# Patient Record
Sex: MALE | Race: WHITE | URBAN - METROPOLITAN AREA
[De-identification: names, ages, dates, MRNs, and addresses within clinical notes are randomized per-mention and may not be internally consistent; named-entity substitution may affect disease eponyms.]

---

## 2018-02-22 ENCOUNTER — OFFICE VISIT (OUTPATIENT)
Dept: BEHAVIORAL/MENTAL HEALTH CLINIC | Age: 28
End: 2018-02-22

## 2018-02-22 VITALS
HEART RATE: 78 BPM | WEIGHT: 278 LBS | DIASTOLIC BLOOD PRESSURE: 74 MMHG | BODY MASS INDEX: 38.92 KG/M2 | HEIGHT: 71 IN | SYSTOLIC BLOOD PRESSURE: 111 MMHG

## 2018-02-22 DIAGNOSIS — F32.A DEPRESSION, UNSPECIFIED DEPRESSION TYPE: ICD-10-CM

## 2018-02-22 DIAGNOSIS — F40.298 SPECIFIC PHOBIA: Primary | ICD-10-CM

## 2018-02-22 RX ORDER — BUPROPION HYDROCHLORIDE 150 MG/1
150 TABLET ORAL
Qty: 30 TAB | Refills: 2 | Status: SHIPPED | OUTPATIENT
Start: 2018-02-22 | End: 2018-03-23 | Stop reason: SDUPTHER

## 2018-02-22 NOTE — MR AVS SNAPSHOT
[address] [facility] [address] [facility] 83. 
[phone] Patient: Steve Swanson MRN: [id number] :1990 Visit Information Date & Time Provider Department Dept. Phone Encounter #  
 2018  1:00 PM Yoana Faustin, [address] [facility] [phone] [id number] Upcoming Health Maintenance Date Due DTaP/Tdap/Td series (1 - Tdap) 10/8/2013 Influenza Age 5 to Adult 2017 Allergies as of 2018  Review Complete On: 2018 By: Yoana Faustin MD  
 Not on File Current Immunizations  Never Reviewed No immunizations on file. Not reviewed this visit Vitals BP Pulse Height(growth percentile) Weight(growth percentile) BMI Smoking Status 111/74 78 5' 11\" (1.803 m) 278 lb (126.1 kg) 38.77 kg/m2 Never Smoker Vitals History BMI and BSA Data Body Mass Index Body Surface Area 38.77 kg/m 2 2.51 m 2 Your Updated Medication List  
  
   
This list is accurate as of 18  1:54 PM.  Always use your most recent med list.  
  
  
  
  
 buPROPion  mg tablet Commonly known as:  Eric De La Rosa Take 1 Tab by mouth every morning. Indications: major depressive disorder Prescriptions Printed Refills buPROPion XL (WELLBUTRIN XL) 150 mg tablet 2 Sig: Take 1 Tab by mouth every morning. Indications: major depressive disorder Class: Print Route: Oral  
  
Introducing [facility] & HEALTH SERVICES! [facility] introduces [employer] patient portal. Now you can access parts of your medical record, email your doctor's office, and request medication refills online. 1. In your internet browser, go to [email or web address] 2. Click on the First Time User? Click Here link in the Sign In box. You will see the New Member Sign Up page. 3. Enter your [employer] Access Code exactly as it appears below.  You will not need to use this code after youve completed the sign-up process. If you do not sign up before the expiration date, you must request a new code. · HedgeCo Access Code: [email or web address] Expires: 5/23/2018 12:49 PM 
 
4. Enter the last four digits of your Social Security Number (xxxx) and Date of Birth (mm/dd/yyyy) as indicated and click Submit. You will be taken to the next sign-up page. 5. Create a HedgeCo ID. This will be your HedgeCo login ID and cannot be changed, so think of one that is secure and easy to remember. 6. Create a HedgeCo password. You can change your password at any time. 7. Enter your Password Reset Question and Answer. This can be used at a later time if you forget your password. 8. Enter your e-mail address. You will receive e-mail notification when new information is available in [address] 9. Click Sign Up. You can now view and download portions of your medical record. 10. Click the Download Summary menu link to download a portable copy of your medical information. If you have questions, please visit the Frequently Asked Questions section of the [employer] website. Remember, [employer]Co is NOT to be used for urgent needs. For medical emergencies, dial 911. Now available from your iPhone and Android! Please provide this summary of care documentation to your next provider. If you have any questions after today's visit, please call [phone].

## 2018-02-22 NOTE — PROGRESS NOTES
Asaf Paxton  1990  24 y.o.  male  [facility] [place]    Chief Complaint   Patient presents with    Anxiety    Fatigue    Depression       [place]:   This is a 24years old single never   male with no past psychiatric history or treatment who is referred by his mother for evaluation and possible treatment of his social isolation. He presented on time, was alert awake oriented to time person and place and was calm and cooperative, polite, and pleasant during the interview. He was able to provide pertinent history and was able to discuss treatment alternatives and decide about the plan. He is not taking any psychiatric medication and has never taken any and he denies any active substance abuse or history of substance abuse ever. Patient report lifelong struggle with social anxiety where he remembered his first days of  when he gets extremely anxious just thinking about going to school. He is struggled with anxiety all his life but was able to complete his bachelor's in science in computer software and was able to hold a job for 1 year when he was pretty much functional and was living on his own but he was laid off about a year ago and recently moved back with his mother as he is not able to secure a job pretty much because of not being able to drive and has never got 's license. He had learner's permit in high school but has severe phobia of going to [facility] and attempting to get one. He started to cry while talking about his inability and was clearly having significant anxiety even talking about going to [facility]. He does show that avoidance behavior pretty much affecting his whole life.     Patient also informed that he does not have a relationship pretty much ever in his life, he had a 3 month relationship at the end of the high school and they went to different schools so it ended and he has never tried to have a relationship while in college or even after college although he prefers female and he does have about 10 friends which she play video game or some time going drink but he never had anybody on which he has confined. He does not w fulfill criteria of avoidant personality disorder or dependent personality disorder and was not observed to have any kind of psychosis or even any delusional thought or paranoia. He denies ever having any auditory visual hallucination, denies any delusion, there is no history of any disorganized thought process or disorganized behavior. He denies any manic or hypomanic episode, denies any symptoms of PTSD, OCD, does not give symptoms of AMANDA except for that he worries a lot and anticipate worst.    Patient is scored 18 on PHQ 9 reporting very difficult to face symptoms and reporting nearly every day symptoms over the past 2 weeks of feeling fatigued and having no energy, feeling bad about himself and guilt, trouble concentrating, and fidgety and restless. He also report overeating more than half of the days of over the past 2 weeks and reports several days of depressed mood, anhedonia, trouble sleeping. He does recognize having episodic depression lasting for days to weeks mostly around the dysfunctionality caused in his life due to his phobia of driving. His Hill anxiety rating scale score is 28 indicating moderate to severe anxiety and he provided history consistent of phobia and some social anxiety and some AMANDA symptoms and report milder panic attacks. Finally his mood disorder questionnaire is negative. Patient was provided with support and psychoeducation, and after discussing risk/benefits/expectations with treatment alternatives available, patient decided to try Wellbutrin  mg specifically targeting his complain of depressed mood, anhedonia, poor motivation, having fatigue and tiredness, and trouble with concentration. He also want to lose weight as he is obese and he has a complaint of overeating when he feel depressed.   He denies any substance abuse ever. PAST PSYCHIATRIC HISTORY:  Patient denies any acute inpatient psychiatric hospitalization, any substance abuse detox or rehab, and denies any suicide attempt ever. FAMILY HISTORY:  Any of first degree relatives including biological parents, siblings, first cousins on both sides, uncle/aunt on both sides, and grand parents on both sides have been diagnosed or treated for any mental illness, substance abuse or attempted/commited suicide. Paternal cousin with substance abuse issue. SUBSTANCE USE HISTORY:   TOBACCO: Smokes couple of times a year since age 27. ALCOHOL: Drinks up to 6 beers approximately twice a year. CANNABIS: Smoke marijuana approximately twice a year since age 27 but denies any abuse. COCAINE, OPIOIDS, and OTHERS: Patient denies. MEDICAL HISTORY:    has no past medical history on file. ALLERGIES:   Not on File    VITAL SIGNS:  /74  Pulse 78  Ht 5' 11\" (1.803 m)  Wt 126.1 kg (278 lb)  BMI 38.77 kg/m2    Current Outpatient Prescriptions   Medication Sig Dispense Refill    buPROPion XL (WELLBUTRIN XL) 150 mg tablet Take 1 Tab by mouth every morning. Indications: major depressive disorder 30 Tab 2       ROS:  Constitutional: Positive for fatigue and weight gain  Eyes: Positive for glasses  ENT: Negative for hearing loss and tinnitus  Respiratory: Negative for cough or wheezing  Cardiovascular: Negative for chest pain, palpitations  Gastrointestinal: Negative for reflux symptoms and constipation  Genitourinary: Negative for frequency and urinary incontinence  Musculoskeletal: Negative for myalgias and arthralgias  Neurological: Positive for memory problems  Behavioral/psych: Positive for anxiety, depression, negative for SI/HI  Endocrine: Negative for diabetic symptoms including polyuria and polydipsia      LEGAL HISTORY:  Patient denies. SOCIAL HISTORY:  Patient was born and raised in [place].   Parents got  when he was in 10 grade.  History of childhood abuse: Patient denies. Education: Bachelor's in computer science with focus on software development.  history: None. Marriage and children: Never  no children. Spiritism/Sprituality: Does not believe in God. MENTAL STATUS EXAMINATION:   Patient is a 24 y.o. male [facility] who looks his stated age. He is obese and wears glasses. Patient appearance is appropriately dressed with fair hygiene. Speech is regular rate and rhythm, fluent language, and thought process is linear, logical, and goal directed. Reported mood is depressed with mood congruent depressed affect. Patient denies any suicidal ideation, homicidal ideation, and auditory visual hallucination. Not observed to be delusional or paranoid. Insight, judgement, reliability and impulse control is intact. Cognition was within normal limit and patient was observed to be reliable. DIAGNOSIS:  Encounter Diagnoses   Name Primary?  Specific phobia Yes    Depression, unspecified depression type        PLAN:  1. MEDICATION: Wellbutrin  mg daily. Patient was provided with psycho education, discussed risk/benefits, and expectations from medication changes. Patient agrees with plan. 2. PSYCHOTHERAPY: Patient was provided with supportive therapy, strongly encourage to seek psychotherapy. Provided written instructions to look into computer assisted CBT specifically focusing on exposure and response prevention treatment for his specific phobia of driving. 3. MEDICAL CARE: Patient was strongly encourage to take their medical medications and follow up with their PCP on regular basis. 4. SUBSTANCE ABUSE CARE: Patient denies any substance abuse at this time. 5. FOLLOW UP:   Follow-up Disposition:  Return in about 4 weeks (around 3/22/2018) for Medication management.

## 2018-03-23 ENCOUNTER — OFFICE VISIT (OUTPATIENT)
Dept: BEHAVIORAL/MENTAL HEALTH CLINIC | Age: 28
End: 2018-03-23

## 2018-03-23 VITALS
HEIGHT: 71 IN | DIASTOLIC BLOOD PRESSURE: 85 MMHG | HEART RATE: 93 BPM | SYSTOLIC BLOOD PRESSURE: 136 MMHG | BODY MASS INDEX: 38.67 KG/M2 | WEIGHT: 276.2 LBS

## 2018-03-23 DIAGNOSIS — F32.A DEPRESSION, UNSPECIFIED DEPRESSION TYPE: ICD-10-CM

## 2018-03-23 DIAGNOSIS — F40.298 SPECIFIC PHOBIA: Primary | ICD-10-CM

## 2018-03-23 DIAGNOSIS — F40.10 SOCIAL ANXIETY DISORDER: ICD-10-CM

## 2018-03-23 RX ORDER — BUPROPION HYDROCHLORIDE 300 MG/1
300 TABLET ORAL
Qty: 30 TAB | Refills: 2 | Status: SHIPPED | OUTPATIENT
Start: 2018-03-23 | End: 2018-05-04 | Stop reason: SDUPTHER

## 2018-03-23 NOTE — PROGRESS NOTES
Floresita Marquez  1990  24 y.o.  male  [place]     Chief Complaint   Patient presents with    Anxiety    Insomnia    Depression       [place]:   This is a 24years old single never   male with no past psychiatric history or treatment who is referred by his mother for evaluation and possible treatment of his social isolation and was seen for the first time on February 22, 2018 when he decided to try Wellbutrin  mg daily to get more motivation and treat his depression. He presented on time today, was alert awake oriented to time person and place and was calm and cooperative, polite, and pleasant during the interview.       Patient report compliance with Wellbutrin  mg daily, is able to tolerate, and denied any benefit but informed that his sleep is hard to straighten up and now he sleep from midnight to 9 AM is compared to staying up all night and sleeping and daytime before. He also informed that despite of his severe phobia of going to [facility] he is able to get his learner's permit and is driving a little bit with plan to get unrestricted license soon so he can restart looking for jobs. He he was kind of downplaying both of his significant achievements and was focused on (and become tearful while talking about his significant) social anxiety.     Patient was provided with support and psychoeducation, and after discussing risk/benefits/expectations with treatment alternatives available, patient decided to increase Wellbutrin  mg specifically targeting his complain of depressed mood, anhedonia, poor motivation, having fatigue and tiredness, and trouble with concentration. He is able to lose 2 pounds in last 4 weeks and also want to lose weight as he is obese and he has a complaint of overeating when he feel depressed. He denies any substance abuse ever.   We again discussed CBT as better way to address his social anxiety and will address his complain in over next few sessions when we are certain about the function of Wellbutrin and his treatment.        SUBSTANCE USE HISTORY:   TOBACCO: Smokes couple of times a year since age 27. ALCOHOL: Drinks up to 6 beers approximately twice a year. CANNABIS: Smoke marijuana approximately twice a year since age 27 but denies any abuse. COCAINE, OPIOIDS, and OTHERS: Patient denies. MEDICAL HISTORY:    has no past medical history on file. ALLERGIES:   No Known Allergies    VITAL SIGNS:  /85  Pulse 93  Ht 5' 11\" (1.803 m)  Wt 125.3 kg (276 lb 3.2 oz)  BMI 38.52 kg/m2    Current Outpatient Prescriptions   Medication Sig Dispense Refill    buPROPion XL (WELLBUTRIN XL) 300 mg XL tablet Take 1 Tab by mouth every morning. Indications: major depressive disorder 30 Tab 2         MENTAL STATUS EXAMINATION:   Patient is a 24 y.o. male [facility] [place] who looks obese and has a stated age. Patient appearance is appropriately dressed with fair hygiene. Speech is regular rate and rhythm, fluent language, and thought process is linear, logical, and goal directed. Reported mood is anxious with mood congruent anxious affect. Patient denies any suicidal ideation, homicidal ideation, and auditory visual hallucination. Not observed to be delusional or paranoid. Insight, judgement, reliability and impulse control is intact. His cognition was within normal limit and he was observed to be reliable. DIAGNOSIS:  Encounter Diagnoses   Name Primary?  Specific phobia Yes    Depression, unspecified depression type     Social anxiety disorder        PLAN:  1. MEDICATION: Increase Wellbutrin  mg daily. We discuss treatment of social anxiety disorder and phobia in detail but at this time we will keep treatment plan simple. Patient was provided with psycho education, discussed risk/benefits, and expectations from medication changes. Patient agrees with plan.      2. PSYCHOTHERAPY: Patient was provided with supportive therapy, strongly encourage to seek psychotherapy. Patient was provided with resources for computer assisted CBT, we will agree that psychotherapy will help him agreed to but it is impossible for him to get a right that often. 3. MEDICAL CARE: Patient was strongly encourage to take their medical medications and follow up with their PCP on regular basis. 4. SUBSTANCE ABUSE CARE: Patient denies any substance abuse at this time. 5. FOLLOW UP:   Follow-up Disposition:  Return in about 4 weeks (around 4/20/2018) for Medication management.

## 2018-03-23 NOTE — MR AVS SNAPSHOT
[address] [facility] 
[phone] Patient: Magalie Cazares MRN: [id number] :1990 Visit Information Date & Time Provider Department Dept. Phone Encounter #  
 3/23/2018  8:30 AM Gloria Abbasi MD [address] [facility] [phone] [id number] Your Appointments 2018  8:30 AM  
ESTABLISHED PATIENT with Gloria Abbasi MD  
[address] [facility]) Appt Note: 4 WEEK F/U  
 [facility] [address] [id number]  
[address] [place] [address] [facility] Upcoming Health Maintenance Date Due DTaP/Tdap/Td series (1 - Tdap) 10/8/2013 Influenza Age 5 to Adult 2017 Allergies as of 3/23/2018  Review Complete On: 3/23/2018 By: Natalia Godinez No Known Allergies Current Immunizations  Never Reviewed No immunizations on file. Not reviewed this visit Vitals BP Pulse Height(growth percentile) Weight(growth percentile) BMI Smoking Status 136/85 93 5' 11\" (1.803 m) 276 lb 3.2 oz (125.3 kg) 38.52 kg/m2 Never Smoker BMI and BSA Data Body Mass Index Body Surface Area 38.52 kg/m 2 2.51 m 2 Preferred Pharmacy Pharmacy Name Phone [facility], [address] [place], [place] [address] AT [address] [phone] Your Updated Medication List  
  
   
This list is accurate as of 3/23/18  8:53 AM.  Always use your most recent med list.  
  
  
  
  
 buPROPion  mg XL tablet Commonly known as:  Donnie Montero Take 1 Tab by mouth every morning. Indications: major depressive disorder Prescriptions Sent to Pharmacy Refills buPROPion XL (WELLBUTRIN XL) 300 mg XL tablet 2 Sig: Take 1 Tab by mouth every morning. Indications: major depressive disorder  Class: Normal  
 Pharmacy: [facility] [place] [id number] AT [address] Ph #: [phone] Route: Oral  
  
Introducing [facility] & HEALTH SERVICES! [facility] introduces [employer] patient portal. Now you can access parts of your medical record, email your doctor's office, and request medication refills online. 1. In your internet browser, go to [email or web address] 2. Click on the First Time User? Click Here link in the Sign In box. You will see the New Member Sign Up page. 3. Enter your [employer]ence Access Code exactly as it appears below. You will not need to use this code after youve completed the sign-up process. If you do not sign up before the expiration date, you must request a new code. · [employer] Access Code: [email or web address] Expires: 5/23/2018  1:49 PM 
 
4. Enter the last four digits of your Social Security Number (xxxx) and Date of Birth (mm/dd/yyyy) as indicated and click Submit. You will be taken to the next sign-up page. 5. Create a Tellagence ID. This will be your Tell[employer]ence login ID and cannot be changed, so think of one that is secure and easy to remember. 6. Create a Tellagence password. You can change your password at any time. 7. Enter your Password Reset Question and Answer. This can be used at a later time if you forget your password. 8. Enter your e-mail address. You will receive e-mail notification when new information is available in [address] 9. Click Sign Up. You can now view and download portions of your medical record. 10. Click the Download Summary menu link to download a portable copy of your medical information. If you have questions, please visit the Frequently Asked Questions section of the [employer] website. Remember, [employer] is NOT to be used for urgent needs. For medical emergencies, dial 911. Now available from your iPhone and Android! Please provide this summary of care documentation to your next provider. If you have any questions after today's visit, please call [phone].

## 2018-05-04 ENCOUNTER — OFFICE VISIT (OUTPATIENT)
Dept: BEHAVIORAL/MENTAL HEALTH CLINIC | Age: 28
End: 2018-05-04

## 2018-05-04 VITALS
HEIGHT: 71 IN | WEIGHT: 266 LBS | HEART RATE: 91 BPM | BODY MASS INDEX: 37.24 KG/M2 | SYSTOLIC BLOOD PRESSURE: 123 MMHG | DIASTOLIC BLOOD PRESSURE: 84 MMHG

## 2018-05-04 DIAGNOSIS — F40.298 SPECIFIC PHOBIA: Primary | ICD-10-CM

## 2018-05-04 DIAGNOSIS — F33.0 MILD EPISODE OF RECURRENT MAJOR DEPRESSIVE DISORDER (HCC): ICD-10-CM

## 2018-05-04 DIAGNOSIS — F40.10 SOCIAL ANXIETY DISORDER: ICD-10-CM

## 2018-05-04 PROBLEM — F32.A MILD DEPRESSION: Status: ACTIVE | Noted: 2018-05-04

## 2018-05-04 RX ORDER — BUPROPION HYDROCHLORIDE 450 MG/1
450 TABLET, FILM COATED, EXTENDED RELEASE ORAL
Qty: 30 TAB | Refills: 2 | Status: SHIPPED | OUTPATIENT
Start: 2018-05-04 | End: 2018-05-04 | Stop reason: SDUPTHER

## 2018-05-04 RX ORDER — BUPROPION HYDROCHLORIDE 300 MG/1
300 TABLET ORAL
Qty: 30 TAB | Refills: 2 | Status: SHIPPED | OUTPATIENT
Start: 2018-05-04 | End: 2018-06-01 | Stop reason: SDUPTHER

## 2018-05-04 RX ORDER — FLUOXETINE 10 MG/1
10 CAPSULE ORAL DAILY
Qty: 30 CAP | Refills: 2 | Status: SHIPPED | OUTPATIENT
Start: 2018-05-04

## 2018-05-04 RX ORDER — BUPROPION HYDROCHLORIDE 150 MG/1
150 TABLET ORAL
Qty: 30 TAB | Refills: 2 | Status: SHIPPED | OUTPATIENT
Start: 2018-05-04 | End: 2018-06-01 | Stop reason: SINTOL

## 2018-05-04 NOTE — PROGRESS NOTES
Pedro Eder  1990  24 y.o.  male  [facility]    Chief Complaint   Patient presents with    Depression    Anxiety       [place]:   This is a 24years old single never   male with no past psychiatric history or treatment who is referred by his mother for evaluation and possible treatment of his social isolation and was seen for the first time on February 22, 2018 when he decided to try Wellbutrin  mg daily to get more motivation and treat his depression. [facility] was seen last time on March 23, 2018 when he hesitantly agreed to try increasing Wellbutrin  mg and return in 4 weeks for reevaluation. He presented on time today, was observed to be more depressed and very distress and cried while talking about his issues with his father relating to his inability to get a job and be independent. He was alert awake oriented to time person and place and was calm and cooperative, polite, and pleasant during the interview.        Patient report compliance with Wellbutrin  mg daily, is able to tolerate, and denied any benefit whatsoever and said that his mother who is sitting outside also think that it is not helping and he would like to be tried on something different especially for his anxiety disorder as this is the cause of his recent discord with his father for past 1 year in the context of him not being able to be independent. He informed that next week he will sign up for [employer] test in order to get his 's license and will try to get job and will move out of his parents as soon as possible house. He was informed that he lost 12 pounds since he started to see me in February and 10 pounds in the last 4 weeks since we went up on Wellbutrin and he was able to accept that he has less appetite. Patient report his sleep is good, denies any tyler or hypomania, denies any auditory visual hallucination, denies any PTSD, OCD, any eating disorder symptoms.   He said that if he is not having issues with his father than he does not consider himself as depressed and is still report symptoms of social anxiety but had made significant strides since taking Wellbutrin. He was provided with support and psychoeducation, and after discussing risk/benefits/expectations with treatment alternatives available, patient decided to increase and maximize Wellbutrin  and try mg specifically targeting his complain of depressed mood, anhedonia, poor motivation, having fatigue and tiredness, and trouble with concentration very low-dose Prozac for his main concern of social anxiety. He denies any substance abuse ever. We again discussed CBT as better way to address his social anxiety and will address his complain in over next few sessions when we are certain about the function of Wellbutrin and his treatment.          SUBSTANCE USE HISTORY:   TOBACCO: Smokes couple of times a year since age 27. ALCOHOL: Drinks up to 6 beers approximately twice a year. CANNABIS: Smoke marijuana approximately twice a year since age 27 but denies any abuse. COCAINE, OPIOIDS, and OTHERS: Patient denies. MEDICAL HISTORY:    has no past medical history on file. ALLERGIES:   No Known Allergies    VITAL SIGNS:  /84  Pulse 91  Ht 5' 11\" (1.803 m)  Wt 120.7 kg (266 lb)  BMI 37.1 kg/m2    Current Outpatient Prescriptions   Medication Sig Dispense Refill    buPROPion  mg Tb24 Take 450 mg by mouth every morning. Indications: major depressive disorder 30 Tab 2    FLUoxetine (PROZAC) 10 mg capsule Take 1 Cap by mouth daily. Indications: major depressive disorder 30 Cap 2     MENTAL STATUS EXAMINATION:   Patient is a 24 y.o. male [address] who looks little bit older than his stated age.   He wears glasses and is obese and become tearful and almost cried while talking about his issues with his father in the background of his inability to be more social.  Patient appearance is casually dressed with fair hygiene. Speech is regular rate and rhythm, fluent language, and thought process is linear, logical, and goal directed. Reported mood is depressed with mood congruent depressed affect. Patient denies any suicidal ideation, homicidal ideation, and auditory visual hallucination. Not observed to be delusional or paranoid. Insight, judgement, reliability and impulse control is intact. His cognition is within normal limit and he was observed to be reliable. DIAGNOSIS:  Encounter Diagnoses   Name Primary?  Specific phobia Yes    Mild episode of recurrent major depressive disorder ([facility] [address]     Social anxiety disorder        PLAN:  1. MEDICATION: Increase Wellbutrin  mg daily. Try Prozac 10 mg in morning to help with the residual symptoms of depression and help with social anxiety disorder. Patient was provided with psycho education, discussed risk/benefits, and expectations from medication changes. Patient agrees with plan. 2. PSYCHOTHERAPY: Patient was provided with supportive therapy, strongly encourage to seek psychotherapy. Patient is hesitant to get formal psychotherapy due to the transportation issues at this time. 3. MEDICAL CARE: Patient was strongly encourage to take their medical medications and follow up with their PCP on regular basis. 4. SUBSTANCE ABUSE CARE: Patient denies any substance abuse at this time. 5. FOLLOW UP:   Follow-up Disposition:  Return for Medication management.

## 2018-05-04 NOTE — MR AVS SNAPSHOT
[address] [facility] 
[phone] Patient: Juanita Pringle MRN: [id number] :1990 Visit Information Date & Time Provider Department Dept. Phone Encounter #  
 2018 10:00 AM Viri Ireland MD [place] [id number] [phone] [id number] Upcoming Health Maintenance Date Due DTaP/Tdap/Td series (1 - Tdap) 10/8/2013 Influenza Age 5 to Adult 2018 Allergies as of 2018  Review Complete On: 2018 By: Leticia Owens No Known Allergies Current Immunizations  Never Reviewed No immunizations on file. Not reviewed this visit Vitals BP Pulse Height(growth percentile) Weight(growth percentile) BMI Smoking Status 123/84 91 5' 11\" (1.803 m) 266 lb (120.7 kg) 37.1 kg/m2 Never Smoker Vitals History BMI and BSA Data Body Mass Index Body Surface Area  
 37.1 kg/m 2 2.46 m 2 Preferred Pharmacy Pharmacy Name Phone [facility] [place] [address] AT [facility] [phone] Your Updated Medication List  
  
   
This list is accurate as of 18 10:15 AM.  Always use your most recent med list.  
  
  
  
  
 buPROPion HCl 450 mg Tb24 Take 450 mg by mouth every morning. Indications: major depressive disorder FLUoxetine 10 mg capsule Commonly known as:  PROzac Take 1 Cap by mouth daily. Indications: major depressive disorder Prescriptions Sent to Pharmacy Refills buPROPion  mg Tb24 2 Sig: Take 450 mg by mouth every morning. Indications: major depressive disorder Class: Normal  
 Pharmacy: [facility]  AT [address] P Ph #: [phone] Route: Oral  
 FLUoxetine (PROZAC) 10 mg capsule 2 Sig: Take 1 Cap by mouth daily. Indications: major depressive disorder Class: Normal  
 [facility] [address] [facility] [address] [place] [address] #: [phone] Route: Oral  
  
Introducing [facility] & HEALTH SERVICES! [facility] introduces [employer] patient portal. Now you can access parts of your medical record, email your doctor's office, and request medication refills online. 1. In your internet browser, go to [email or web address] 2. Click on the First Time User? Click Here link in the Sign In box. You will see the New Member Sign Up page. 3. Enter your [employer] Access Code exactly as it appears below. You will not need to use this code after youve completed the sign-up process. If you do not sign up before the expiration date, you must request a new code. · [employer] Access Code: [email or web address] Expires: 5/23/2018  1:49 PM 
 
4. Enter the last four digits of your Social Security Number (xxxx) and Date of Birth (mm/dd/yyyy) as indicated and click Submit. You will be taken to the next sign-up page. 5. Create a [employer] ID. This will be your [employer] login ID and cannot be changed, so think of one that is secure and easy to remember. 6. Create a [employer] password. You can change your password at any time. 7. Enter your Password Reset Question and Answer. This can be used at a later time if you forget your password. 8. Enter your e-mail address. You will receive e-mail notification when new information is available in [address] 9. Click Sign Up. You can now view and download portions of your medical record. 10. Click the Download Summary menu link to download a portable copy of your medical information. If you have questions, please visit the Frequently Asked Questions section of the [employer] website. Remember, [employer] is NOT to be used for urgent needs. For medical emergencies, dial 911. Now available from your iPhone and Android! Please provide this summary of care documentation to your next provider. If you have any questions after today's visit, please call [phone].

## 2018-06-01 ENCOUNTER — OFFICE VISIT (OUTPATIENT)
Dept: BEHAVIORAL/MENTAL HEALTH CLINIC | Age: 28
End: 2018-06-01

## 2018-06-01 VITALS
HEART RATE: 81 BPM | DIASTOLIC BLOOD PRESSURE: 87 MMHG | HEIGHT: 71 IN | SYSTOLIC BLOOD PRESSURE: 126 MMHG | WEIGHT: 264 LBS | BODY MASS INDEX: 36.96 KG/M2

## 2018-06-01 DIAGNOSIS — F40.10 SOCIAL ANXIETY DISORDER: Primary | ICD-10-CM

## 2018-06-01 DIAGNOSIS — F33.0 MILD EPISODE OF RECURRENT MAJOR DEPRESSIVE DISORDER (HCC): ICD-10-CM

## 2018-06-01 PROBLEM — E66.01 SEVERE OBESITY (BMI 35.0-39.9): Status: ACTIVE | Noted: 2018-06-01

## 2018-06-01 RX ORDER — BUPROPION HYDROCHLORIDE 300 MG/1
300 TABLET ORAL
Qty: 90 TAB | Refills: 1 | Status: SHIPPED | OUTPATIENT
Start: 2018-06-01

## 2018-06-01 NOTE — MR AVS SNAPSHOT
[address] [facility] [address] [facility] 
[phone] Patient: Gunner Lobo MRN: [id number] :1990 Visit Information Date & Time Provider Department Dept. Phone Encounter #  
 2018  9:30 AM Abundio Washington MD [address] [facility] [phone] [id number] Upcoming Health Maintenance Date Due DTaP/Tdap/Td series (1 - Tdap) 10/8/2013 Influenza Age 5 to Adult 2018 Allergies as of 2018  Review Complete On: 2018 By: Jose F Carter No Known Allergies Current Immunizations  Never Reviewed No immunizations on file. Not reviewed this visit Vitals BP Pulse Height(growth percentile) Weight(growth percentile) BMI Smoking Status 126/87 81 5' 11\" (1.803 m) 264 lb (119.7 kg) 36.82 kg/m2 Never Smoker Vitals History BMI and BSA Data Body Mass Index Body Surface Area  
 36.82 kg/m 2 2.45 m 2 Preferred Pharmacy Pharmacy Name Phone [facility] [address] [facility] [phone] Your Updated Medication List  
  
   
This list is accurate as of 18  9:52 AM.  Always use your most recent med list.  
  
  
  
  
 buPROPion  mg XL tablet Commonly known as:  [place] Take 1 Tab by mouth every morning. Indications: ANXIETY WITH DEPRESSION FLUoxetine 10 mg capsule Commonly known as:  PROzac Take 1 Cap by mouth daily. Indications: major depressive disorder Prescriptions Sent to Pharmacy Refills buPROPion XL (WELLBUTRIN XL) 300 mg XL tablet 1 Sig: Take 1 Tab by mouth every morning. Indications: ANXIETY WITH DEPRESSION Class: Normal  
 Pharmacy: [facility]  AT [address] P Ph #: [phone] Route: Oral  
  
Introducing [facility] & HEALTH SERVICES! [employer] introduces [employer] patient portal. Now you can access parts of your medical record, email your doctor's office, and request medication refills online. 1. In your internet browser, go to [email or web address] 2. Click on the First Time User? Click Here link in the Sign In box. You will see the New Member Sign Up page. 3. Enter your [employer] Access Code exactly as it appears below. You will not need to use this code after youve completed the sign-up process. If you do not sign up before the expiration date, you must request a new code. · [employer] Access Code: [email or web address] [facility] Expires: 8/30/2018  9:50 AM 
 
4. Enter the last four digits of your Social Security Number (xxxx) and Date of Birth (mm/dd/yyyy) as indicated and click Submit. You will be taken to the next sign-up page. 5. Create a [employer] ID. This will be your [employer] login ID and cannot be changed, so think of one that is secure and easy to remember. 6. Create a [employer] password. You can change your password at any time. 7. Enter your Password Reset Question and Answer. This can be used at a later time if you forget your password. 8. Enter your e-mail address. You will receive e-mail notification when new information is available in [address] 9. Click Sign Up. You can now view and download portions of your medical record. 10. Click the Download Summary menu link to download a portable copy of your medical information. If you have questions, please visit the Frequently Asked Questions section of the [employer] website. Remember, [employer] is NOT to be used for urgent needs. For medical emergencies, dial 911. Now available from your iPhone and Android! Please provide this summary of care documentation to your next provider. If you have any questions after today's visit, please call [phone].

## 2018-06-01 NOTE — PROGRESS NOTES
Nena Talbert  1990  24 y.o.  male  [facility][note 3]    Chief Complaint   Patient presents with    Anxiety    Insomnia    Weight Gain    Medication Problem       [place]:   This is a 24years old single never   male with no past psychiatric history or treatment who is referred by his mother for evaluation and possible treatment of his social isolation and was seen for the first time on February 22, 2018 when he decided to try Wellbutrin  mg daily to get more motivation and treat his depression. [place] was seen last time on May 5, 2018 when he agreed to increasing and maximizing Wellbutrin  mg along with adding Prozac 10 mg to provide coverage for his main issue of social anxiety and return in 4 weeks for reevaluation. Patient presented on time today, was observed to be much improved and not at all depressed, distress like his last visit when he cried while talking about his issues with his father relating to his inability to get a job and be independent. He was alert awake oriented to time person and place and was calm and cooperative, polite, and pleasant during the interview. He requested to discontinue Prozac and decrease dose of Wellbutrin as he does not see any benefit and is having side effect of increased appetite with possible weight gain and severe sleep trouble where he only sleeps 4-5 hours a night. He is very happy to inform that he drove here today and not to rely on his mother after getting his 's license recently and he has an interview today with a good prospect and job in [place].  Lupe [place]  Patient denies any tyler or hypomania, denies any auditory visual hallucination, denies any PTSD, OCD, any eating disorder symptoms. He said that if he is not having issues with his father than he does not consider himself as depressed and is still report symptoms of social anxiety but had made significant strides since taking Wellbutrin.   He was provided with support and psychoeducation, and after discussing risk/benefits/expectations with treatment alternatives available, patient decided to decrease back Wellbutrin  and discontinue Prozac. He denies any substance abuse ever. He agreed to call me and come see me if he requires otherwise if he gets his job and moved to [facility] have his own apartment it will be very much therapeutic and achieving of her goal of treatment and he may not require any other medication besides Wellbutrin at this time.        SUBSTANCE USE HISTORY:   TOBACCO: Smokes couple of times a year since age 27. ALCOHOL: Drinks up to 6 beers approximately twice a year. CANNABIS: Smoke marijuana approximately twice a year since age 27 but denies any abuse. COCAINE, OPIOIDS, and OTHERS: Patient denies. MEDICAL HISTORY:    has no past medical history on file. ALLERGIES:   No Known Allergies    VITAL SIGNS:  /87  Pulse 81  Ht 5' 11\" (1.803 m)  Wt 119.7 kg (264 lb)  BMI 36.82 kg/m2    Current Outpatient Prescriptions   Medication Sig Dispense Refill    buPROPion XL (WELLBUTRIN XL) 300 mg XL tablet Take 1 Tab by mouth every morning. Indications: ANXIETY WITH DEPRESSION 90 Tab 1    FLUoxetine (PROZAC) 10 mg capsule Take 1 Cap by mouth daily. Indications: major depressive disorder 30 Cap 2         MENTAL STATUS EXAMINATION:   Patient is a 24 y.o. male [facility] who looks his stated age. He is tall and obese despite of losing weight, casually dressed with good hygiene. Speech is regular rate and rhythm, fluent language, and thought process is linear, logical, and goal directed. Reported mood is better with mood congruent brighter affect. Patient denies any suicidal ideation, homicidal ideation, and auditory visual hallucination. Not observed to be delusional or paranoid. Insight, judgement, reliability and impulse control is intact.   His cognition is within normal limit and he is observed to be reliable. DIAGNOSIS:  Encounter Diagnoses   Name Primary?  Social anxiety disorder Yes    Mild episode of recurrent major depressive disorder ([facility] [address]        PLAN:  1. MEDICATION:   1. Discontinue Prozac 10 mg daily for lack of effect and side effect of increased appetite and insomnia. He lost 2 pounds since last seen a month ago and 13 pound since March 24 possibly due to Wellbutrin. 2.  Decrease Wellbutrin  mg daily to address insomnia and probably the increase is not that much effective. He will go back to 450 mg dose if is started to feel depressed. Patient was provided with psycho education, discussed risk/benefits, and expectations from medication changes. Patient agrees with plan. 2. PSYCHOTHERAPY: Patient was provided with supportive therapy, strongly encourage to seek psychotherapy. Patient will be possibly moving to [place] for job and was offered as needed appointment if he requires otherwise he had made significant progress with his initial goal of getting his life back by having 's license and able to drive and have a job interview today. 3. MEDICAL CARE: Patient was strongly encourage to take their medical medications and follow up with their PCP on regular basis. 4. SUBSTANCE ABUSE CARE: Patient denies any substance abuse. 5. FOLLOW UP:   Follow-up Disposition:  Return if symptoms worsen or fail to improve.